# Patient Record
Sex: MALE | Race: WHITE | Employment: FULL TIME | URBAN - METROPOLITAN AREA
[De-identification: names, ages, dates, MRNs, and addresses within clinical notes are randomized per-mention and may not be internally consistent; named-entity substitution may affect disease eponyms.]

---

## 2017-06-08 ENCOUNTER — OFFICE VISIT (OUTPATIENT)
Dept: FAMILY MEDICINE CLINIC | Facility: CLINIC | Age: 47
End: 2017-06-08

## 2017-06-08 ENCOUNTER — TELEPHONE (OUTPATIENT)
Dept: FAMILY MEDICINE CLINIC | Facility: CLINIC | Age: 47
End: 2017-06-08

## 2017-06-08 VITALS
DIASTOLIC BLOOD PRESSURE: 83 MMHG | HEIGHT: 70.16 IN | WEIGHT: 186.63 LBS | RESPIRATION RATE: 17 BRPM | SYSTOLIC BLOOD PRESSURE: 122 MMHG | HEART RATE: 66 BPM | BODY MASS INDEX: 26.72 KG/M2 | TEMPERATURE: 98 F

## 2017-06-08 DIAGNOSIS — S20.212D CHEST WALL CONTUSION, LEFT, SUBSEQUENT ENCOUNTER: ICD-10-CM

## 2017-06-08 DIAGNOSIS — S69.91XD RIGHT WRIST INJURY, SUBSEQUENT ENCOUNTER: ICD-10-CM
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
## 2017-06-08 PROCEDURE — 99212 OFFICE O/P EST SF 10 MIN: CPT | Performed by: FAMILY MEDICINE

## 2017-06-08 PROCEDURE — 99213 OFFICE O/P EST LOW 20 MIN: CPT | Performed by: FAMILY MEDICINE

## 2017-06-08 RX ORDER — TRAMADOL HYDROCHLORIDE 50 MG/1
TABLET ORAL
Refills: 0 | COMMUNITY
Start: 2017-06-02 | End: 2017-12-21

## 2017-06-08 RX ORDER — NAPROXEN SODIUM 550 MG/1
TABLET ORAL
Refills: 0 | COMMUNITY
Start: 2017-06-02 | End: 2017-12-21

## 2017-06-08 NOTE — TELEPHONE ENCOUNTER
Left a message. Per wife she will have him call us back. When the patient calls back please schedule accordingly as below. Thank You.
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
## 2017-06-08 NOTE — TELEPHONE ENCOUNTER
Can you give an acute visit with me tomorrow?   If patient's schedule does not accommodate okay to see SK
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
## 2017-12-08 RX ORDER — LISINOPRIL AND HYDROCHLOROTHIAZIDE 20; 12.5 MG/1; MG/1
TABLET ORAL
Qty: 60 TABLET | Refills: 1 | Status: SHIPPED | OUTPATIENT
Start: 2017-12-08 | End: 2018-02-07

## 2017-12-21 ENCOUNTER — OFFICE VISIT (OUTPATIENT)
Dept: FAMILY MEDICINE CLINIC | Facility: CLINIC | Age: 47
End: 2017-12-21

## 2017-12-21 VITALS
WEIGHT: 186 LBS | HEART RATE: 60 BPM | RESPIRATION RATE: 16 BRPM | DIASTOLIC BLOOD PRESSURE: 88 MMHG | SYSTOLIC BLOOD PRESSURE: 142 MMHG | TEMPERATURE: 98 F | BODY MASS INDEX: 27 KG/M2

## 2017-12-21 DIAGNOSIS — R05.9 COUGH: Primary | ICD-10-CM

## 2017-12-21 PROCEDURE — 99213 OFFICE O/P EST LOW 20 MIN: CPT | Performed by: FAMILY MEDICINE

## 2017-12-21 PROCEDURE — 99212 OFFICE O/P EST SF 10 MIN: CPT | Performed by: FAMILY MEDICINE

## 2017-12-21 RX ORDER — AZITHROMYCIN 250 MG/1
TABLET, FILM COATED ORAL
Qty: 6 TABLET | Refills: 0 | Status: SHIPPED | OUTPATIENT
Start: 2017-12-21 | End: 2018-03-05 | Stop reason: ALTCHOICE

## 2017-12-21 NOTE — PROGRESS NOTES
HPI: Jose Peck is a 49year old male who presents for cold symptoms for the past 10 days. Has stuffy nose, cough, and chest congestion. No fever. Has nasal drainage. No sinus pressure or pain. No v/d. Family members have been sick.  Using OTC products which

## 2018-02-07 RX ORDER — LISINOPRIL AND HYDROCHLOROTHIAZIDE 20; 12.5 MG/1; MG/1
TABLET ORAL
Qty: 60 TABLET | Refills: 0 | Status: SHIPPED | OUTPATIENT
Start: 2018-02-07 | End: 2018-03-17

## 2018-02-07 NOTE — TELEPHONE ENCOUNTER
Please call patient to schedule appointment.  Must have appointment before further refills ROUTINE PHYSICAL with fasting labs

## 2018-02-19 NOTE — TELEPHONE ENCOUNTER
Advised patient of Dr. Nestora Lanes note below. Patient verbalized understanding. Physical scheduled on 03/05/18 at 2:15 p.m.

## 2018-03-05 ENCOUNTER — APPOINTMENT (OUTPATIENT)
Dept: LAB | Age: 48
End: 2018-03-05
Attending: FAMILY MEDICINE
Payer: COMMERCIAL

## 2018-03-05 ENCOUNTER — OFFICE VISIT (OUTPATIENT)
Dept: FAMILY MEDICINE CLINIC | Facility: CLINIC | Age: 48
End: 2018-03-05

## 2018-03-05 VITALS
HEIGHT: 70.16 IN | RESPIRATION RATE: 17 BRPM | SYSTOLIC BLOOD PRESSURE: 123 MMHG | HEART RATE: 62 BPM | BODY MASS INDEX: 26.63 KG/M2 | WEIGHT: 186 LBS | DIASTOLIC BLOOD PRESSURE: 83 MMHG | TEMPERATURE: 98 F

## 2018-03-05 DIAGNOSIS — Z00.00 ROUTINE PHYSICAL EXAMINATION: Primary | ICD-10-CM

## 2018-03-05 DIAGNOSIS — I10 BENIGN ESSENTIAL HTN: ICD-10-CM

## 2018-03-05 DIAGNOSIS — Z00.00 ROUTINE PHYSICAL EXAMINATION: ICD-10-CM

## 2018-03-05 LAB
ANION GAP SERPL CALC-SCNC: 9 MMOL/L (ref 0–18)
BUN SERPL-MCNC: 15 MG/DL (ref 8–20)
BUN/CREAT SERPL: 15 (ref 10–20)
CALCIUM SERPL-MCNC: 9.3 MG/DL (ref 8.5–10.5)
CHLORIDE SERPL-SCNC: 100 MMOL/L (ref 95–110)
CHOLEST SERPL-MCNC: 197 MG/DL (ref 110–200)
CO2 SERPL-SCNC: 30 MMOL/L (ref 22–32)
CREAT SERPL-MCNC: 1 MG/DL (ref 0.5–1.5)
GLUCOSE SERPL-MCNC: 90 MG/DL (ref 70–99)
HDLC SERPL-MCNC: 58 MG/DL
LDLC SERPL CALC-MCNC: 125 MG/DL (ref 0–99)
NONHDLC SERPL-MCNC: 139 MG/DL
OSMOLALITY UR CALC.SUM OF ELEC: 288 MOSM/KG (ref 275–295)
POTASSIUM SERPL-SCNC: 3.6 MMOL/L (ref 3.3–5.1)
SODIUM SERPL-SCNC: 139 MMOL/L (ref 136–144)
TRIGL SERPL-MCNC: 69 MG/DL (ref 1–149)

## 2018-03-05 PROCEDURE — 90715 TDAP VACCINE 7 YRS/> IM: CPT | Performed by: FAMILY MEDICINE

## 2018-03-05 PROCEDURE — 90674 CCIIV4 VAC NO PRSV 0.5 ML IM: CPT | Performed by: FAMILY MEDICINE

## 2018-03-05 PROCEDURE — 80048 BASIC METABOLIC PNL TOTAL CA: CPT

## 2018-03-05 PROCEDURE — 90471 IMMUNIZATION ADMIN: CPT | Performed by: FAMILY MEDICINE

## 2018-03-05 PROCEDURE — 99396 PREV VISIT EST AGE 40-64: CPT | Performed by: FAMILY MEDICINE

## 2018-03-05 PROCEDURE — 36415 COLL VENOUS BLD VENIPUNCTURE: CPT

## 2018-03-05 PROCEDURE — 90472 IMMUNIZATION ADMIN EACH ADD: CPT | Performed by: FAMILY MEDICINE

## 2018-03-05 PROCEDURE — 80061 LIPID PANEL: CPT

## 2018-03-05 NOTE — PROGRESS NOTES
HPI:    Patient ID: Terry Washington is a 49year old male. HPI    Review of Systems   Constitutional: Negative. Respiratory: Negative. Cardiovascular: Negative. Gastrointestinal: Negative. Skin: Negative. Neurological: Negative.

## 2018-03-19 RX ORDER — LISINOPRIL AND HYDROCHLOROTHIAZIDE 20; 12.5 MG/1; MG/1
TABLET ORAL
Qty: 60 TABLET | Refills: 11 | Status: SHIPPED | OUTPATIENT
Start: 2018-03-19 | End: 2019-03-10

## 2019-01-10 ENCOUNTER — MED REC SCAN ONLY (OUTPATIENT)
Dept: FAMILY MEDICINE CLINIC | Facility: CLINIC | Age: 49
End: 2019-01-10

## 2019-03-12 RX ORDER — LISINOPRIL AND HYDROCHLOROTHIAZIDE 20; 12.5 MG/1; MG/1
TABLET ORAL
Qty: 60 TABLET | Refills: 1 | Status: SHIPPED | OUTPATIENT
Start: 2019-03-12 | End: 2019-05-10

## 2019-03-12 NOTE — TELEPHONE ENCOUNTER
Please call patient and schedule appt, one refill sent but MUST make appt at this time. Routine physical with fasting labs, please bring home blood pressures if available.

## 2019-03-23 NOTE — TELEPHONE ENCOUNTER
Spoke with May Willis, notified him that he needs to schedule a PE with fasting labs.  Pt verbalized understanding and will schedule when he can

## 2019-04-09 ENCOUNTER — OFFICE VISIT (OUTPATIENT)
Dept: FAMILY MEDICINE CLINIC | Facility: CLINIC | Age: 49
End: 2019-04-09
Payer: COMMERCIAL

## 2019-04-09 ENCOUNTER — APPOINTMENT (OUTPATIENT)
Dept: LAB | Age: 49
End: 2019-04-09
Attending: FAMILY MEDICINE
Payer: COMMERCIAL

## 2019-04-09 VITALS
RESPIRATION RATE: 18 BRPM | HEART RATE: 61 BPM | BODY MASS INDEX: 26.77 KG/M2 | WEIGHT: 187 LBS | DIASTOLIC BLOOD PRESSURE: 78 MMHG | SYSTOLIC BLOOD PRESSURE: 117 MMHG | TEMPERATURE: 97 F | HEIGHT: 70.25 IN

## 2019-04-09 DIAGNOSIS — Z00.00 ROUTINE PHYSICAL EXAMINATION: Primary | ICD-10-CM

## 2019-04-09 DIAGNOSIS — Z00.00 ROUTINE PHYSICAL EXAMINATION: ICD-10-CM

## 2019-04-09 DIAGNOSIS — Z80.0 FAMILY HISTORY OF COLON CANCER: ICD-10-CM

## 2019-04-09 DIAGNOSIS — Z12.11 COLON CANCER SCREENING: ICD-10-CM

## 2019-04-09 PROBLEM — H53.009 LAZY EYE: Status: RESOLVED | Noted: 2019-04-09 | Resolved: 2019-04-09

## 2019-04-09 PROCEDURE — 80048 BASIC METABOLIC PNL TOTAL CA: CPT

## 2019-04-09 PROCEDURE — 99396 PREV VISIT EST AGE 40-64: CPT | Performed by: FAMILY MEDICINE

## 2019-04-09 PROCEDURE — 36415 COLL VENOUS BLD VENIPUNCTURE: CPT

## 2019-04-09 PROCEDURE — 80061 LIPID PANEL: CPT

## 2019-04-09 NOTE — PROGRESS NOTES
HPI:    Patient ID: Claudia Boo is a 51year old male. HPI    Review of Systems   Constitutional: Negative. Respiratory: Negative. Cardiovascular: Negative. Gastrointestinal: Negative. Skin: Negative. Neurological: Negative.

## 2019-05-10 RX ORDER — LISINOPRIL AND HYDROCHLOROTHIAZIDE 20; 12.5 MG/1; MG/1
TABLET ORAL
Qty: 60 TABLET | Refills: 3 | Status: SHIPPED | OUTPATIENT
Start: 2019-05-10 | End: 2019-09-17

## 2019-09-17 RX ORDER — LISINOPRIL AND HYDROCHLOROTHIAZIDE 20; 12.5 MG/1; MG/1
TABLET ORAL
Qty: 90 TABLET | Refills: 1 | Status: SHIPPED | OUTPATIENT
Start: 2019-09-17 | End: 2020-01-08

## 2019-09-17 NOTE — TELEPHONE ENCOUNTER
Refill passed per [facility] protocol.   Hypertensive Medications  Protocol Criteria:  · Appointment scheduled in the past 6 months or in the next 3 months  · BMP or CMP in the past 12 months  · Creatinine result < 2  Recent Outpatient Visits

## 2020-01-09 RX ORDER — LISINOPRIL AND HYDROCHLOROTHIAZIDE 20; 12.5 MG/1; MG/1
TABLET ORAL
Qty: 90 TABLET | Refills: 0 | Status: SHIPPED | OUTPATIENT
Start: 2020-01-09 | End: 2020-02-20

## 2020-01-31 ENCOUNTER — NURSE ONLY (OUTPATIENT)
Dept: GASTROENTEROLOGY | Facility: CLINIC | Age: 50
End: 2020-01-31

## 2020-01-31 DIAGNOSIS — Z12.11 COLON CANCER SCREENING: Primary | ICD-10-CM

## 2020-01-31 NOTE — PROGRESS NOTES
Pt contacted and reviewed allergies, pharmacy, medications, and GI sxs (none). Pt has never had a colonoscopy in the past and wants to schedule d/t age.     Anticoagulants:N/A  Diabetic Meds: N/A  BP meds(Ace inhibitors/ARB's):Lisinopril/HCTZ- 2 tabs daily

## 2020-02-03 RX ORDER — POLYETHYLENE GLYCOL 3350, SODIUM CHLORIDE, SODIUM BICARBONATE, POTASSIUM CHLORIDE 420; 11.2; 5.72; 1.48 G/4L; G/4L; G/4L; G/4L
POWDER, FOR SOLUTION ORAL
Qty: 1 BOTTLE | Refills: 0 | Status: ON HOLD | OUTPATIENT
Start: 2020-02-03 | End: 2020-06-02

## 2020-02-03 NOTE — PROGRESS NOTES
Scheduled for:  Colonoscopy 67275  Provider Name: Dr. Drew Morse  Date:  3/31/20  Location:  [facility]  Sedation:  MAC  Time:   0900 (pt is aware to arrive at 0800)   Prep:  Phoenix Jeffers, sent via [employer]/Allergies Reconciled?:  Physician reviewed   Diagnosis with c

## 2020-02-03 NOTE — PROGRESS NOTES
47year old male patient of Dr. Kati Wall with PMHx BMI 26, HTN, family history of prostate, not colon cancer per report, who denies current GI sxs. No prior colonoscopy. No recent CBC on file.      Schedule screening CLN with IV or MAC with Dr. Lynette Gupta

## 2020-02-20 NOTE — TELEPHONE ENCOUNTER
Lucila Oliver needs a refill of:                    • LISINOPRIL-HYDROCHLOROTHIAZIDE 20-12.5 MG Oral Tab TAKE 2 TABLETS BY MOUTH DAILY 90 tablet 0

## 2020-02-21 RX ORDER — LISINOPRIL AND HYDROCHLOROTHIAZIDE 20; 12.5 MG/1; MG/1
2 TABLET ORAL
Qty: 180 TABLET | Refills: 1 | Status: SHIPPED | OUTPATIENT
Start: 2020-02-21 | End: 2020-08-25

## 2020-02-21 NOTE — TELEPHONE ENCOUNTER
Please review; protocol failed. Requested Prescriptions     Pending Prescriptions Disp Refills   • Lisinopril-hydroCHLOROthiazide 20-12.5 MG Oral Tab 90 tablet 0     Sig: Take 2 tablets by mouth once daily.          Recent Visits  Date Type Provider Dept

## 2020-03-03 ENCOUNTER — OFFICE VISIT (OUTPATIENT)
Dept: FAMILY MEDICINE CLINIC | Facility: CLINIC | Age: 50
End: 2020-03-03
Payer: COMMERCIAL

## 2020-03-03 VITALS
WEIGHT: 194 LBS | SYSTOLIC BLOOD PRESSURE: 128 MMHG | HEIGHT: 71 IN | BODY MASS INDEX: 27.16 KG/M2 | DIASTOLIC BLOOD PRESSURE: 88 MMHG | HEART RATE: 60 BPM

## 2020-03-03 DIAGNOSIS — I10 ESSENTIAL HYPERTENSION: Primary | ICD-10-CM

## 2020-03-03 PROCEDURE — 99212 OFFICE O/P EST SF 10 MIN: CPT | Performed by: FAMILY MEDICINE

## 2020-03-03 NOTE — PROGRESS NOTES
HPI:    Patient ID: Jaki Torres is a 47year old male. Hypertension   This is a chronic problem. The current episode started more than 1 year ago. The problem is unchanged. The problem is controlled.  Pertinent negatives include no anxiety, blurred v

## 2020-03-18 ENCOUNTER — TELEPHONE (OUTPATIENT)
Dept: GASTROENTEROLOGY | Facility: CLINIC | Age: 50
End: 2020-03-18

## 2020-03-18 DIAGNOSIS — Z12.11 COLON CANCER SCREENING: Primary | ICD-10-CM

## 2020-03-18 NOTE — TELEPHONE ENCOUNTER
Rescheduled for:  Colonoscopy 00279  Provider Name: Dr. Isabella Felty  Date:  FROM - 3/31/20                  TO - 6/2/20  Location:  [facility]  Sedation:  MAC  Time:   FROM - 9:00 am              TO - 11:15 am (pt is aware to arrive at 10:15 am)   Prep:  Aurelia Velásquez sent

## 2020-06-02 ENCOUNTER — ANESTHESIA (OUTPATIENT)
Dept: ENDOSCOPY | Facility: HOSPITAL | Age: 50
End: 2020-06-02
Payer: COMMERCIAL

## 2020-06-02 ENCOUNTER — TELEPHONE (OUTPATIENT)
Dept: GASTROENTEROLOGY | Facility: CLINIC | Age: 50
End: 2020-06-02

## 2020-06-02 ENCOUNTER — ANESTHESIA EVENT (OUTPATIENT)
Dept: ENDOSCOPY | Facility: HOSPITAL | Age: 50
End: 2020-06-02
Payer: COMMERCIAL

## 2020-06-02 ENCOUNTER — NURSE TRIAGE (OUTPATIENT)
Dept: FAMILY MEDICINE CLINIC | Facility: CLINIC | Age: 50
End: 2020-06-02

## 2020-06-02 ENCOUNTER — HOSPITAL ENCOUNTER (EMERGENCY)
Facility: HOSPITAL | Age: 50
Discharge: HOME | End: 2020-06-02
Attending: PHYSICIAN ASSISTANT
Payer: COMMERCIAL

## 2020-06-02 ENCOUNTER — HOSPITAL ENCOUNTER (OUTPATIENT)
Facility: HOSPITAL | Age: 50
Setting detail: HOSPITAL OUTPATIENT SURGERY
Discharge: HOME | End: 2020-06-02
Attending: INTERNAL MEDICINE | Admitting: INTERNAL MEDICINE
Payer: COMMERCIAL

## 2020-06-02 VITALS
OXYGEN SATURATION: 98 % | DIASTOLIC BLOOD PRESSURE: 98 MMHG | BODY MASS INDEX: 26.6 KG/M2 | WEIGHT: 190 LBS | RESPIRATION RATE: 16 BRPM | SYSTOLIC BLOOD PRESSURE: 135 MMHG | TEMPERATURE: 99 F | HEART RATE: 69 BPM | HEIGHT: 71 IN

## 2020-06-02 VITALS
HEART RATE: 60 BPM | DIASTOLIC BLOOD PRESSURE: 89 MMHG | SYSTOLIC BLOOD PRESSURE: 131 MMHG | WEIGHT: 180 LBS | OXYGEN SATURATION: 100 % | RESPIRATION RATE: 12 BRPM | BODY MASS INDEX: 25.2 KG/M2 | TEMPERATURE: 98 F | HEIGHT: 71 IN

## 2020-06-02 DIAGNOSIS — Z12.11 COLON CANCER SCREENING: Primary | ICD-10-CM

## 2020-06-02 DIAGNOSIS — K64.9 HEMORRHOIDS: ICD-10-CM

## 2020-06-02 DIAGNOSIS — Z01.818 PRE-OP TESTING: ICD-10-CM

## 2020-06-02 DIAGNOSIS — S05.02XA ABRASION OF LEFT CORNEA, INITIAL ENCOUNTER: Primary | ICD-10-CM

## 2020-06-02 DIAGNOSIS — R03.0 ELEVATED BLOOD PRESSURE READING: ICD-10-CM

## 2020-06-02 PROCEDURE — 0DJD8ZZ INSPECTION OF LOWER INTESTINAL TRACT, VIA NATURAL OR ARTIFICIAL OPENING ENDOSCOPIC: ICD-10-PCS | Performed by: INTERNAL MEDICINE

## 2020-06-02 PROCEDURE — 45378 DIAGNOSTIC COLONOSCOPY: CPT | Performed by: INTERNAL MEDICINE

## 2020-06-02 PROCEDURE — 99283 EMERGENCY DEPT VISIT LOW MDM: CPT

## 2020-06-02 RX ORDER — SODIUM CHLORIDE, SODIUM LACTATE, POTASSIUM CHLORIDE, CALCIUM CHLORIDE 600; 310; 30; 20 MG/100ML; MG/100ML; MG/100ML; MG/100ML
INJECTION, SOLUTION INTRAVENOUS CONTINUOUS
Status: CANCELLED | OUTPATIENT
Start: 2020-06-02

## 2020-06-02 RX ORDER — SODIUM CHLORIDE, SODIUM LACTATE, POTASSIUM CHLORIDE, CALCIUM CHLORIDE 600; 310; 30; 20 MG/100ML; MG/100ML; MG/100ML; MG/100ML
INJECTION, SOLUTION INTRAVENOUS CONTINUOUS
Status: DISCONTINUED | OUTPATIENT
Start: 2020-06-02 | End: 2020-06-02

## 2020-06-02 RX ORDER — TRAMADOL HYDROCHLORIDE 50 MG/1
50 TABLET ORAL EVERY 6 HOURS PRN
Qty: 12 TABLET | Refills: 0 | Status: SHIPPED | OUTPATIENT
Start: 2020-06-02 | End: 2020-10-29 | Stop reason: ALTCHOICE

## 2020-06-02 RX ORDER — TETRACAINE HYDROCHLORIDE 5 MG/ML
1 SOLUTION OPHTHALMIC ONCE
Status: COMPLETED | OUTPATIENT
Start: 2020-06-02 | End: 2020-06-02

## 2020-06-02 RX ORDER — ERYTHROMYCIN 5 MG/G
1 OINTMENT OPHTHALMIC
Qty: 1 G | Refills: 0 | Status: SHIPPED | OUTPATIENT
Start: 2020-06-02 | End: 2020-06-09

## 2020-06-02 RX ORDER — ONDANSETRON 2 MG/ML
4 INJECTION INTRAMUSCULAR; INTRAVENOUS ONCE AS NEEDED
Status: CANCELLED | OUTPATIENT
Start: 2020-06-02 | End: 2020-06-02

## 2020-06-02 RX ORDER — NALOXONE HYDROCHLORIDE 0.4 MG/ML
80 INJECTION, SOLUTION INTRAMUSCULAR; INTRAVENOUS; SUBCUTANEOUS AS NEEDED
Status: CANCELLED | OUTPATIENT
Start: 2020-06-02 | End: 2020-06-02

## 2020-06-02 RX ORDER — LIDOCAINE HYDROCHLORIDE 10 MG/ML
INJECTION, SOLUTION EPIDURAL; INFILTRATION; INTRACAUDAL; PERINEURAL AS NEEDED
Status: DISCONTINUED | OUTPATIENT
Start: 2020-06-02 | End: 2020-06-02 | Stop reason: SURG

## 2020-06-02 RX ADMIN — LIDOCAINE HYDROCHLORIDE 50 MG: 10 INJECTION, SOLUTION EPIDURAL; INFILTRATION; INTRACAUDAL; PERINEURAL at 11:23:00

## 2020-06-02 RX ADMIN — SODIUM CHLORIDE, SODIUM LACTATE, POTASSIUM CHLORIDE, CALCIUM CHLORIDE: 600; 310; 30; 20 INJECTION, SOLUTION INTRAVENOUS at 11:57:00

## 2020-06-02 NOTE — ED PROVIDER NOTES
Patient Seen in: [facility] AND [facility] Emergency Department    History   Patient presents with: Eye Visual Problem    Stated Complaint:     HPI    69-year-old male presents with chief complaint of left eye pain. Onset 1230 today.   Patient states that he unde Never Used    Alcohol use:  Yes      Alcohol/week: 2.5 standard drinks      Types: 3 Cans of beer per week      Comment: (beer & wine), 1 beer weekly    Drug use: No      Review of Systems    Positive for stated complaint:   Other systems are as noted in HP soft, nontender, nondistended. There is no rebound tenderness or guarding. No organomegaly is noted. No peritoneal signs. Genitourinary: Not examined. Lymphatic: No gross lymphadenopathy noted.   Musculoskeletal: Musculoskeletal system is grossly intact pressure. Medications Prescribed:  Current Discharge Medication List    START taking these medications    erythromycin 5 MG/GM Ophthalmic Ointment  Apply 1 Application to eye every 4 (four) hours while awake for 7 days.   Qty: 1 g Refills: 0    traMADol

## 2020-06-02 NOTE — TELEPHONE ENCOUNTER
Colonoscopy recall entered for 6/2/2030 in [facility] and placed in health maintenance      Inpatient Notes   Received:  Today   Message Contents   Jonnie Root MD  P Em Gi Clinical Staff             GI staff: please place recall in for colonoscopy in 10 year Procedure:  Informed consent was obtained from the patient after the risks of the procedure were discussed, including but not limited to bleeding, perforation, aspiration, infection, or possibility of a missed lesion.  After discussions of the risks/benefit

## 2020-06-02 NOTE — ANESTHESIA POSTPROCEDURE EVALUATION
Patient: Jose Camejo    Procedure Summary     Date:  06/02/20 Room / Location:  [facility] ENDOSCOPY 01 / [facility] ENDOSCOPY    Anesthesia Start:  6968 Anesthesia Stop:  8541    Procedure:  COLONOSCOPY (N/A ) Diagnosis:       Colon cancer screening      (hemorrhoi

## 2020-06-02 NOTE — ANESTHESIA PREPROCEDURE EVALUATION
Anesthesia PreOp Note    HPI:     Gaby Arboleda is a 47year old male who presents for preoperative consultation requested by: Blanca Martinez MD    Date of Surgery: 6/2/2020    Procedure(s):  COLONOSCOPY  Indication: Colon cancer screening    Relevant Occupational History      Not on file    Social Needs      Financial resource strain: Not on file      Food insecurity:        Worry: Not on file        Inability: Not on file      Transportation needs:        Medical: Not on file        Non-medical: Not o °C). His blood pressure is 125/81 and his pulse is 69. His respiration is 16 and oxygen saturation is 99%.     05/29/20  1448 06/02/20  1032 06/02/20  1046   BP:  125/81    Pulse:  69    Resp:  16    Temp:   97.7 °F (36.5 °C)   TempSrc:  Oral Oral   SpO2:

## 2020-06-02 NOTE — TELEPHONE ENCOUNTER
Action Requested: Summary for Provider     []  Critical Lab, Recommendations Needed  [] Need Additional Advice  []   FYI    []   Need Orders  [] Need Medications Sent to Pharmacy  []  Other     SUMMARY: Advised Immediate care ([place])    Reason for call:

## 2020-06-02 NOTE — ED NOTES
Pt states he had colonoscopy done earlier today and when he woke up from anesthesia his left eye was bothering him. Pt states he went home and eye was still bothering him so he decided to get it checked out.  Pt left eye noted to be irritated with clear tea

## 2020-06-02 NOTE — H&P
History & Physical Examination    Patient Name: Oren Raman  MRN: [id number]  [facility]: [id number]  YOB: 1970    Diagnosis: screening for colon cancer    Patient currently denies any GI symptoms of nausea, vomiting, dyspepsia, dysphagia, hema They understand and agree to proceed with plan of care. I have reviewed the History and Physical done within the last 30 days. Any changes noted above.     Renate Romero, [address] [place] [address] - Gastroenterology  6/2/2020  11:24 AM

## 2020-06-02 NOTE — ED INITIAL ASSESSMENT (HPI)
Pt states he had a colonoscopy earlier today and when he woke up from anesthesia he felt \" something in his left eye\" he did tell staff who mentioned that he was rubbing his eye during the procedure. Pt has pain and tearing to left eye.  Pt denies trauma

## 2020-06-02 NOTE — OPERATIVE REPORT
COLONOSCOPY REPORT    Samy Cuevas     1970 Age 47year old   PCP Alesha Buck MD Endoscopist Wade Trimble MD     Date of procedure: 20    Procedure: Colonoscopy     Pre-operative diagnosis: Screening    Post-operative diagnos no masses palpated. Impression:   · Normal colonoscopy to the terminal ileum, other than small internal hemorrhoids. Recommend:  · Repeat CLN in 10 years. If new signs or symptoms develop, colonoscopy may need to be repeated sooner.    · High fiber

## 2020-06-03 NOTE — ED NOTES
Pt left ed ambulatory with steady gait with wife. Pt speaking full clear sentences without difficulty. Pt verbalized understanding and importance of follow up and d/c instructions.  Pt told to continue to take his HTN meds as soon as possible because he did

## 2020-06-04 ENCOUNTER — TELEPHONE (OUTPATIENT)
Dept: GASTROENTEROLOGY | Facility: CLINIC | Age: 50
End: 2020-06-04

## 2020-06-04 NOTE — DISCHARGE SUMMARY
Wife informed RN that  needed to go to ER several hours after colonoscopy and was diagnosed with scratched cornea. Spoke with recovery nurse and she stated that GI MD was informed and saw patient. Unit manager to follow up with GI doctor.

## 2020-06-04 NOTE — TELEPHONE ENCOUNTER
Patient had a c-scope with me 2 days ago. I spoke with patient and wife today. He did have some red eyes on L side after c-scope but he said he usually gets red eyes after getting dehydrated and this was not new for him.  I offered IV fluids after c-scope t

## 2020-08-25 ENCOUNTER — TELEPHONE (OUTPATIENT)
Dept: FAMILY MEDICINE CLINIC | Facility: CLINIC | Age: 50
End: 2020-08-25

## 2020-08-25 RX ORDER — LISINOPRIL AND HYDROCHLOROTHIAZIDE 20; 12.5 MG/1; MG/1
2 TABLET ORAL
Qty: 180 TABLET | Refills: 1 | Status: SHIPPED | OUTPATIENT
Start: 2020-08-25 | End: 2021-01-25

## 2020-08-25 NOTE — TELEPHONE ENCOUNTER
Emory Vail needs a refill of:    Lisinopril-hydroCHLOROthiazide 20-12.5 MG Oral Tab Take 2 tablets by mouth once daily.  180 tablet 1

## 2020-08-25 NOTE — TELEPHONE ENCOUNTER
Spoke with the patient informed him of the doctors message below.  Pt states that he will call back to schedule his physical.

## 2020-08-25 NOTE — TELEPHONE ENCOUNTER
Please let patient know refill sent to pharmacy but must schedule for routine physical with fasting labs.

## 2020-10-29 ENCOUNTER — LAB ENCOUNTER (OUTPATIENT)
Dept: LAB | Age: 50
End: 2020-10-29
Attending: FAMILY MEDICINE
Payer: COMMERCIAL

## 2020-10-29 ENCOUNTER — OFFICE VISIT (OUTPATIENT)
Dept: FAMILY MEDICINE CLINIC | Facility: CLINIC | Age: 50
End: 2020-10-29
Payer: COMMERCIAL

## 2020-10-29 VITALS
SYSTOLIC BLOOD PRESSURE: 114 MMHG | HEIGHT: 69.5 IN | HEART RATE: 72 BPM | TEMPERATURE: 97 F | WEIGHT: 197 LBS | DIASTOLIC BLOOD PRESSURE: 78 MMHG | BODY MASS INDEX: 28.52 KG/M2 | RESPIRATION RATE: 16 BRPM

## 2020-10-29 DIAGNOSIS — H91.90 HEARING LOSS, UNSPECIFIED HEARING LOSS TYPE, UNSPECIFIED LATERALITY: ICD-10-CM

## 2020-10-29 DIAGNOSIS — Z00.00 ROUTINE PHYSICAL EXAMINATION: Primary | ICD-10-CM

## 2020-10-29 DIAGNOSIS — E78.5 HYPERLIPIDEMIA, UNSPECIFIED HYPERLIPIDEMIA TYPE: ICD-10-CM

## 2020-10-29 DIAGNOSIS — Z00.00 ROUTINE PHYSICAL EXAMINATION: ICD-10-CM

## 2020-10-29 PROBLEM — Z98.890 HISTORY OF COLONOSCOPY: Status: ACTIVE | Noted: 2020-10-29

## 2020-10-29 PROBLEM — Z80.42 FAMILY HISTORY OF PROSTATE CANCER IN FATHER: Status: ACTIVE | Noted: 2020-10-29

## 2020-10-29 PROCEDURE — 3074F SYST BP LT 130 MM HG: CPT | Performed by: FAMILY MEDICINE

## 2020-10-29 PROCEDURE — 99396 PREV VISIT EST AGE 40-64: CPT | Performed by: FAMILY MEDICINE

## 2020-10-29 PROCEDURE — 36415 COLL VENOUS BLD VENIPUNCTURE: CPT

## 2020-10-29 PROCEDURE — 85027 COMPLETE CBC AUTOMATED: CPT

## 2020-10-29 PROCEDURE — 90750 HZV VACC RECOMBINANT IM: CPT | Performed by: FAMILY MEDICINE

## 2020-10-29 PROCEDURE — 3078F DIAST BP <80 MM HG: CPT | Performed by: FAMILY MEDICINE

## 2020-10-29 PROCEDURE — 80048 BASIC METABOLIC PNL TOTAL CA: CPT

## 2020-10-29 PROCEDURE — 90471 IMMUNIZATION ADMIN: CPT | Performed by: FAMILY MEDICINE

## 2020-10-29 PROCEDURE — 80061 LIPID PANEL: CPT

## 2020-10-29 PROCEDURE — 3008F BODY MASS INDEX DOCD: CPT | Performed by: FAMILY MEDICINE

## 2020-10-29 NOTE — PROGRESS NOTES
HPI:    Patient ID: Terry Washington is a 47year old male. HPI    Review of Systems   Constitutional: Negative. HENT: Positive for hearing loss. Respiratory: Negative. Cardiovascular: Negative. Gastrointestinal: Negative. Skin: Negative. [Shingrix -Shingles] (32318)      Meds This Visit:  Requested Prescriptions      No prescriptions requested or ordered in this encounter       Imaging & Referrals:  [facility]  [facility]#[id number]

## 2021-01-25 RX ORDER — LISINOPRIL AND HYDROCHLOROTHIAZIDE 20; 12.5 MG/1; MG/1
2 TABLET ORAL
Qty: 180 TABLET | Refills: 1 | Status: SHIPPED | OUTPATIENT
Start: 2021-01-25 | End: 2021-07-30

## 2021-01-25 NOTE — TELEPHONE ENCOUNTER
Margret Anderson needs a refill of:    Current Outpatient Medications   Medication Sig Dispense Refill   • Lisinopril-hydroCHLOROthiazide 20-12.5 MG Oral Tab Take 2 tablets by mouth once daily.  180 tablet 1

## 2021-03-01 ENCOUNTER — OFFICE VISIT (OUTPATIENT)
Dept: ALLERGY | Facility: CLINIC | Age: 51
End: 2021-03-01
Payer: COMMERCIAL

## 2021-03-01 ENCOUNTER — NURSE ONLY (OUTPATIENT)
Dept: ALLERGY | Facility: CLINIC | Age: 51
End: 2021-03-01
Payer: COMMERCIAL

## 2021-03-01 VITALS
OXYGEN SATURATION: 99 % | WEIGHT: 197 LBS | DIASTOLIC BLOOD PRESSURE: 80 MMHG | HEART RATE: 67 BPM | SYSTOLIC BLOOD PRESSURE: 119 MMHG | BODY MASS INDEX: 28.52 KG/M2 | HEIGHT: 69.5 IN

## 2021-03-01 DIAGNOSIS — J30.89 PERENNIAL ALLERGIC RHINITIS: Primary | ICD-10-CM

## 2021-03-01 DIAGNOSIS — R09.81 NASAL CONGESTION: ICD-10-CM

## 2021-03-01 DIAGNOSIS — J30.89 PERENNIAL ALLERGIC RHINITIS: ICD-10-CM

## 2021-03-01 PROCEDURE — 3074F SYST BP LT 130 MM HG: CPT | Performed by: ALLERGY & IMMUNOLOGY

## 2021-03-01 PROCEDURE — 3008F BODY MASS INDEX DOCD: CPT | Performed by: ALLERGY & IMMUNOLOGY

## 2021-03-01 PROCEDURE — 99204 OFFICE O/P NEW MOD 45 MIN: CPT | Performed by: ALLERGY & IMMUNOLOGY

## 2021-03-01 PROCEDURE — 3079F DIAST BP 80-89 MM HG: CPT | Performed by: ALLERGY & IMMUNOLOGY

## 2021-03-01 PROCEDURE — 95024 IQ TESTS W/ALLERGENIC XTRCS: CPT | Performed by: ALLERGY & IMMUNOLOGY

## 2021-03-01 PROCEDURE — 95004 PERQ TESTS W/ALRGNC XTRCS: CPT | Performed by: ALLERGY & IMMUNOLOGY

## 2021-03-01 RX ORDER — LEVOCETIRIZINE DIHYDROCHLORIDE 5 MG/1
5 TABLET, FILM COATED ORAL NIGHTLY
Qty: 30 TABLET | Refills: 0 | Status: SHIPPED | OUTPATIENT
Start: 2021-03-01

## 2021-03-01 NOTE — PATIENT INSTRUCTIONS
Recs:  Handouts on allergies and avoidance measures provided and reviewed including the potential treatment option of immunotherapy  Trial of Xyzal, levocetirizine 5 mg once a night at bedtime as a nonsedating antihistamine in place of Zyrtec  May add Flon

## 2021-03-01 NOTE — PROGRESS NOTES
Shahrzad Canchola is a 45year old male. HPI:   Patient presents with:   Allergies: patient presents for allergy evaluations, in the past 2 months has had increased nasal congestion, nasal congestion is clear, has had a rash on arms and shoulders, itchy wi Social History    Tobacco Use      Smoking status: Never Smoker      Smokeless tobacco: Never Used    Alcohol use: Yes      Alcohol/week: 2.5 standard drinks      Types: 3 Cans of beer per week      Comment: (beer & wine), 1 beer weekly    Drug use:  No lungs are clear to auscultation bilaterally normal respiratory effort   Cardiovascular: regular rate and rhythm no murmurs, gallups, or rubs  Abdomen: soft non-tender non-distended  Skin/Hair: no unusual rashes present  Extremities: no edema, cyanosis, or to self administration of these medications. Teaching, instruction and sample was provided to the patient by myself. Teaching included  a review of potential adverse side effects as well as potential efficacy.   Patient's questions were answered in regard

## 2021-07-30 RX ORDER — LISINOPRIL AND HYDROCHLOROTHIAZIDE 20; 12.5 MG/1; MG/1
2 TABLET ORAL DAILY
Qty: 180 TABLET | Refills: 0 | Status: SHIPPED | OUTPATIENT
Start: 2021-07-30 | End: 2021-10-26

## 2021-10-26 RX ORDER — LISINOPRIL AND HYDROCHLOROTHIAZIDE 20; 12.5 MG/1; MG/1
2 TABLET ORAL DAILY
Qty: 180 TABLET | Refills: 0 | Status: SHIPPED | OUTPATIENT
Start: 2021-10-26 | End: 2022-02-02

## 2022-02-02 RX ORDER — LISINOPRIL AND HYDROCHLOROTHIAZIDE 20; 12.5 MG/1; MG/1
2 TABLET ORAL DAILY
Qty: 180 TABLET | Refills: 0 | Status: SHIPPED | OUTPATIENT
Start: 2022-02-02 | End: 2022-05-06

## 2022-02-02 NOTE — TELEPHONE ENCOUNTER
Refill passed per [address] [facility] protocol. Requested Prescriptions   Pending Prescriptions Disp Refills    LISINOPRIL-HYDROCHLOROTHIAZIDE 20-12.5 MG Oral Tab [Pharmacy Med Name: LISINOPRIL-HCTZ 20/12.5MG TABLETS] 180 tablet 0     Sig: TAKE 2 TABLETS BY MOUTH DAILY        Hypertensive Medications Protocol Failed - 2/2/2022  8:45 AM        Failed - CMP or BMP in past 12 months        Passed - Appointment in past 6 or next 3 months        Passed - GFR Non- > 50     Lab Results   Component Value Date    [facility] 73 10/29/2020                       Future Appointments         Provider Department Appt Notes    In 2 days Wallace Chan MD [address], [address] [place] about shingles vaccine. I think I missed the second dose.             Recent Outpatient Visits              11 months ago Perennial allergic rhinitis    [facility], [address] [facility] [place], [place]    Nurse Only    11 months ago Perennial allergic rhinitis    Joyce Reyes MD    Office Visit    1 year ago Routine physical examination    Ammy Peterson MD    Office Visit    1 year ago Essential hypertension    Ammy Peterson MD    Office Visit    2 years ago Colon cancer screening    [address] GI PROCEDURE    Nurse Only

## 2022-02-04 ENCOUNTER — OFFICE VISIT (OUTPATIENT)
Dept: FAMILY MEDICINE CLINIC | Facility: CLINIC | Age: 52
End: 2022-02-04
Payer: COMMERCIAL

## 2022-02-04 VITALS
WEIGHT: 203.38 LBS | SYSTOLIC BLOOD PRESSURE: 116 MMHG | HEART RATE: 74 BPM | DIASTOLIC BLOOD PRESSURE: 80 MMHG | HEIGHT: 70.5 IN | TEMPERATURE: 98 F | RESPIRATION RATE: 18 BRPM | BODY MASS INDEX: 28.79 KG/M2

## 2022-02-04 DIAGNOSIS — H91.90 HEARING LOSS, UNSPECIFIED HEARING LOSS TYPE, UNSPECIFIED LATERALITY: ICD-10-CM

## 2022-02-04 DIAGNOSIS — Z00.00 ROUTINE PHYSICAL EXAMINATION: Primary | ICD-10-CM

## 2022-02-04 DIAGNOSIS — I10 ESSENTIAL HYPERTENSION: ICD-10-CM

## 2022-02-04 DIAGNOSIS — S20.211A CONTUSION OF RIGHT CHEST WALL, INITIAL ENCOUNTER: ICD-10-CM

## 2022-02-04 DIAGNOSIS — Z12.5 SCREENING FOR PROSTATE CANCER: ICD-10-CM

## 2022-02-04 PROCEDURE — 90472 IMMUNIZATION ADMIN EACH ADD: CPT | Performed by: FAMILY MEDICINE

## 2022-02-04 PROCEDURE — 3074F SYST BP LT 130 MM HG: CPT | Performed by: FAMILY MEDICINE

## 2022-02-04 PROCEDURE — 90471 IMMUNIZATION ADMIN: CPT | Performed by: FAMILY MEDICINE

## 2022-02-04 PROCEDURE — 90750 HZV VACC RECOMBINANT IM: CPT | Performed by: FAMILY MEDICINE

## 2022-02-04 PROCEDURE — 90686 IIV4 VACC NO PRSV 0.5 ML IM: CPT | Performed by: FAMILY MEDICINE

## 2022-02-04 PROCEDURE — 99396 PREV VISIT EST AGE 40-64: CPT | Performed by: FAMILY MEDICINE

## 2022-02-04 PROCEDURE — 3008F BODY MASS INDEX DOCD: CPT | Performed by: FAMILY MEDICINE

## 2022-02-04 PROCEDURE — 3079F DIAST BP 80-89 MM HG: CPT | Performed by: FAMILY MEDICINE

## 2022-04-26 ENCOUNTER — OFFICE VISIT (OUTPATIENT)
Dept: AUDIOLOGY | Facility: CLINIC | Age: 52
End: 2022-04-26
Payer: COMMERCIAL

## 2022-04-26 DIAGNOSIS — H83.3X9: Primary | ICD-10-CM

## 2022-04-26 PROCEDURE — 92567 TYMPANOMETRY: CPT | Performed by: AUDIOLOGIST

## 2022-04-26 PROCEDURE — 92557 COMPREHENSIVE HEARING TEST: CPT | Performed by: AUDIOLOGIST

## 2022-05-06 RX ORDER — LISINOPRIL AND HYDROCHLOROTHIAZIDE 20; 12.5 MG/1; MG/1
2 TABLET ORAL DAILY
Qty: 180 TABLET | Refills: 0 | Status: SHIPPED | OUTPATIENT
Start: 2022-05-06

## 2022-05-06 NOTE — TELEPHONE ENCOUNTER
Please review. Protocol failed. Requested Prescriptions   Pending Prescriptions Disp Refills    LISINOPRIL-HYDROCHLOROTHIAZIDE 20-12.5 MG Oral Tab [Pharmacy Med Name: LISINOPRIL-HCTZ 20/12.5MG TABLETS] 180 tablet 0     Sig: Take 2 tablets by mouth daily.         Hypertensive Medications Protocol Failed - 5/5/2022  3:06 PM        Failed - CMP or BMP in past 12 months        Passed - Appointment in past 6 or next 3 months        Passed - GFR Non- > 50     Lab Results   Component Value Date    [facility] 73 10/29/2020                       Recent Outpatient Visits              1 week ago Noise effect on inner ear, unspecified laterality    [facility] Ryanne Chandler    Office Visit    3 months ago Routine physical examination    [facility], Vonda Cordero MD    Office Visit    1 year ago Perennial allergic rhinitis    [facility], [address] [place] [address]    Nurse Only    1 year ago Perennial allergic rhinitis    Ricky Gay MD    Office Visit    1 year ago Routine physical examination    [facility], Vonda Cordero MD    Office Visit

## 2022-08-07 RX ORDER — LISINOPRIL AND HYDROCHLOROTHIAZIDE 20; 12.5 MG/1; MG/1
2 TABLET ORAL DAILY
Qty: 180 TABLET | Refills: 0 | Status: SHIPPED | OUTPATIENT
Start: 2022-08-07

## 2022-11-08 RX ORDER — LISINOPRIL AND HYDROCHLOROTHIAZIDE 20; 12.5 MG/1; MG/1
2 TABLET ORAL DAILY
Qty: 180 TABLET | Refills: 0 | Status: SHIPPED | OUTPATIENT
Start: 2022-11-08

## 2023-01-12 ENCOUNTER — OFFICE VISIT (OUTPATIENT)
Dept: DERMATOLOGY CLINIC | Facility: CLINIC | Age: 53
End: 2023-01-12

## 2023-01-12 DIAGNOSIS — L82.1 SEBORRHEIC KERATOSIS: Primary | ICD-10-CM

## 2023-01-12 PROCEDURE — 99202 OFFICE O/P NEW SF 15 MIN: CPT | Performed by: STUDENT IN AN ORGANIZED HEALTH CARE EDUCATION/TRAINING PROGRAM

## 2023-02-09 NOTE — TELEPHONE ENCOUNTER
Spoke to patient, he only has a couple days left of Lisinopril. He said he will schedule a physical on [facility] after work. He requested a 30-day refill  until he can be seen?

## 2023-02-09 NOTE — TELEPHONE ENCOUNTER
Please contact patient to schedule routine physical.  Send back to me if refill needed prior to visit.

## 2023-02-10 RX ORDER — LISINOPRIL AND HYDROCHLOROTHIAZIDE 20; 12.5 MG/1; MG/1
2 TABLET ORAL DAILY
Qty: 180 TABLET | Refills: 0 | Status: SHIPPED | OUTPATIENT
Start: 2023-02-10

## 2023-02-10 NOTE — TELEPHONE ENCOUNTER
Dr Remberto Mcguire  =please advice,thanks     Future Appointments   Date Time Provider Cristopher Kahn   4/13/2023  1:30 PM Derrick Gilmore  [address] [place] [address]

## 2023-04-27 ENCOUNTER — LAB ENCOUNTER (OUTPATIENT)
Dept: LAB | Age: 53
End: 2023-04-27
Attending: FAMILY MEDICINE
Payer: COMMERCIAL

## 2023-04-27 ENCOUNTER — OFFICE VISIT (OUTPATIENT)
Dept: FAMILY MEDICINE CLINIC | Facility: CLINIC | Age: 53
End: 2023-04-27

## 2023-04-27 VITALS
WEIGHT: 205.5 LBS | HEIGHT: 70.87 IN | DIASTOLIC BLOOD PRESSURE: 88 MMHG | TEMPERATURE: 97 F | HEART RATE: 60 BPM | BODY MASS INDEX: 28.77 KG/M2 | SYSTOLIC BLOOD PRESSURE: 128 MMHG

## 2023-04-27 DIAGNOSIS — Z12.5 PROSTATE CANCER SCREENING: ICD-10-CM

## 2023-04-27 DIAGNOSIS — E78.5 HYPERLIPIDEMIA, UNSPECIFIED HYPERLIPIDEMIA TYPE: ICD-10-CM

## 2023-04-27 DIAGNOSIS — Z00.00 ANNUAL PHYSICAL EXAM: ICD-10-CM

## 2023-04-27 DIAGNOSIS — I10 ESSENTIAL HYPERTENSION: ICD-10-CM

## 2023-04-27 DIAGNOSIS — Z00.00 ANNUAL PHYSICAL EXAM: Primary | ICD-10-CM

## 2023-04-27 DIAGNOSIS — Z98.890 HISTORY OF COLONOSCOPY: ICD-10-CM

## 2023-04-27 PROBLEM — Z80.0 FAMILY HISTORY OF COLON CANCER IN FATHER: Status: ACTIVE | Noted: 2023-04-27

## 2023-04-27 LAB
ANION GAP SERPL CALC-SCNC: 5 MMOL/L (ref 0–18)
BUN BLD-MCNC: 13 MG/DL (ref 7–18)
BUN/CREAT SERPL: 11.3 (ref 10–20)
CALCIUM BLD-MCNC: 9.6 MG/DL (ref 8.5–10.1)
CHLORIDE SERPL-SCNC: 103 MMOL/L (ref 98–112)
CHOLEST SERPL-MCNC: 202 MG/DL (ref ?–200)
CO2 SERPL-SCNC: 31 MMOL/L (ref 21–32)
COMPLEXED PSA SERPL-MCNC: 0.84 NG/ML (ref ?–4)
CREAT BLD-MCNC: 1.15 MG/DL
DEPRECATED RDW RBC AUTO: 42.1 FL (ref 35.1–46.3)
ERYTHROCYTE [DISTWIDTH] IN BLOOD BY AUTOMATED COUNT: 12.5 % (ref 11–15)
FASTING PATIENT LIPID ANSWER: NO
FASTING STATUS PATIENT QL REPORTED: NO
GFR SERPLBLD BASED ON 1.73 SQ M-ARVRAT: 76 ML/MIN/1.73M2 (ref 60–?)
GLUCOSE BLD-MCNC: 100 MG/DL (ref 70–99)
HCT VFR BLD AUTO: 46.5 %
HDLC SERPL-MCNC: 58 MG/DL (ref 40–59)
HGB BLD-MCNC: 14.9 G/DL
LDLC SERPL CALC-MCNC: 126 MG/DL (ref ?–100)
MCH RBC QN AUTO: 29.4 PG (ref 26–34)
MCHC RBC AUTO-ENTMCNC: 32 G/DL (ref 31–37)
MCV RBC AUTO: 91.9 FL
NONHDLC SERPL-MCNC: 144 MG/DL (ref ?–130)
OSMOLALITY SERPL CALC.SUM OF ELEC: 288 MOSM/KG (ref 275–295)
PLATELET # BLD AUTO: 218 10(3)UL (ref 150–450)
POTASSIUM SERPL-SCNC: 4.3 MMOL/L (ref 3.5–5.1)
RBC # BLD AUTO: 5.06 X10(6)UL
SODIUM SERPL-SCNC: 139 MMOL/L (ref 136–145)
TRIGL SERPL-MCNC: 100 MG/DL (ref 30–149)
VLDLC SERPL CALC-MCNC: 18 MG/DL (ref 0–30)
WBC # BLD AUTO: 6.7 X10(3) UL (ref 4–11)

## 2023-04-27 PROCEDURE — 99396 PREV VISIT EST AGE 40-64: CPT | Performed by: FAMILY MEDICINE

## 2023-04-27 PROCEDURE — 3074F SYST BP LT 130 MM HG: CPT | Performed by: FAMILY MEDICINE

## 2023-04-27 PROCEDURE — 3008F BODY MASS INDEX DOCD: CPT | Performed by: FAMILY MEDICINE

## 2023-04-27 PROCEDURE — 80048 BASIC METABOLIC PNL TOTAL CA: CPT

## 2023-04-27 PROCEDURE — 85027 COMPLETE CBC AUTOMATED: CPT

## 2023-04-27 PROCEDURE — 36415 COLL VENOUS BLD VENIPUNCTURE: CPT

## 2023-04-27 PROCEDURE — 80061 LIPID PANEL: CPT

## 2023-04-27 PROCEDURE — 3079F DIAST BP 80-89 MM HG: CPT | Performed by: FAMILY MEDICINE

## 2024-07-20 ENCOUNTER — HOSPITAL ENCOUNTER (EMERGENCY)
Facility: HOSPITAL | Age: 54
Discharge: HOME | End: 2024-07-20
Attending: EMERGENCY MEDICINE
Payer: COMMERCIAL

## 2024-07-20 ENCOUNTER — APPOINTMENT (OUTPATIENT)
Dept: GENERAL RADIOLOGY | Facility: HOSPITAL | Age: 54
End: 2024-07-20
Attending: EMERGENCY MEDICINE
Payer: COMMERCIAL

## 2024-07-20 VITALS
OXYGEN SATURATION: 95 % | SYSTOLIC BLOOD PRESSURE: 129 MMHG | DIASTOLIC BLOOD PRESSURE: 88 MMHG | HEART RATE: 63 BPM | WEIGHT: 190 LBS | TEMPERATURE: 98 F | RESPIRATION RATE: 20 BRPM | BODY MASS INDEX: 26.6 KG/M2 | HEIGHT: 71 IN

## 2024-07-20 DIAGNOSIS — S16.1XXA STRAIN OF NECK MUSCLE, INITIAL ENCOUNTER: Primary | ICD-10-CM
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
## 2024-07-20 PROCEDURE — 72040 X-RAY EXAM NECK SPINE 2-3 VW: CPT | Performed by: EMERGENCY MEDICINE

## 2024-07-20 PROCEDURE — 99284 EMERGENCY DEPT VISIT MOD MDM: CPT

## 2024-07-20 PROCEDURE — 99283 EMERGENCY DEPT VISIT LOW MDM: CPT

## 2024-07-20 RX ORDER — IBUPROFEN 600 MG/1
600 TABLET ORAL ONCE
Status: COMPLETED | OUTPATIENT
Start: 2024-07-20 | End: 2024-07-20
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
## 2024-08-19 ENCOUNTER — OFFICE VISIT (OUTPATIENT)
Dept: FAMILY MEDICINE CLINIC | Facility: CLINIC | Age: 54
End: 2024-08-19
Payer: COMMERCIAL

## 2024-08-19 ENCOUNTER — LAB ENCOUNTER (OUTPATIENT)
Dept: LAB | Age: 54
End: 2024-08-19
Attending: FAMILY MEDICINE
Payer: COMMERCIAL

## 2024-08-19 VITALS
HEIGHT: 70 IN | BODY MASS INDEX: 28.35 KG/M2 | WEIGHT: 198 LBS | OXYGEN SATURATION: 95 % | SYSTOLIC BLOOD PRESSURE: 128 MMHG | HEART RATE: 60 BPM | DIASTOLIC BLOOD PRESSURE: 88 MMHG | TEMPERATURE: 98 F

## 2024-08-19 DIAGNOSIS — I10 ESSENTIAL HYPERTENSION: ICD-10-CM

## 2024-08-19 DIAGNOSIS — E66.3 OVERWEIGHT (BMI 25.0-29.9): ICD-10-CM

## 2024-08-19 DIAGNOSIS — Z00.00 ROUTINE PHYSICAL EXAMINATION: ICD-10-CM

## 2024-08-19 DIAGNOSIS — Z00.00 ROUTINE PHYSICAL EXAMINATION: Primary | ICD-10-CM

## 2024-08-19 DIAGNOSIS — E78.5 HYPERLIPIDEMIA, UNSPECIFIED HYPERLIPIDEMIA TYPE: ICD-10-CM

## 2024-08-19 LAB
ANION GAP SERPL CALC-SCNC: 6 MMOL/L (ref 0–18)
BUN BLD-MCNC: 19 MG/DL (ref 9–23)
BUN/CREAT SERPL: 16.2 (ref 10–20)
CALCIUM BLD-MCNC: 9.6 MG/DL (ref 8.7–10.4)
CHLORIDE SERPL-SCNC: 104 MMOL/L (ref 98–112)
CHOLEST SERPL-MCNC: 212 MG/DL (ref ?–200)
CO2 SERPL-SCNC: 30 MMOL/L (ref 21–32)
COMPLEXED PSA SERPL-MCNC: 0.54 NG/ML (ref ?–4)
CREAT BLD-MCNC: 1.17 MG/DL
DEPRECATED RDW RBC AUTO: 41.3 FL (ref 35.1–46.3)
EGFRCR SERPLBLD CKD-EPI 2021: 74 ML/MIN/1.73M2 (ref 60–?)
ERYTHROCYTE [DISTWIDTH] IN BLOOD BY AUTOMATED COUNT: 12.3 % (ref 11–15)
FASTING PATIENT LIPID ANSWER: NO
FASTING STATUS PATIENT QL REPORTED: NO
GLUCOSE BLD-MCNC: 98 MG/DL (ref 70–99)
HCT VFR BLD AUTO: 43.9 %
HDLC SERPL-MCNC: 54 MG/DL (ref 40–59)
HGB BLD-MCNC: 14.5 G/DL
LDLC SERPL CALC-MCNC: 137 MG/DL (ref ?–100)
MCH RBC QN AUTO: 30.1 PG (ref 26–34)
MCHC RBC AUTO-ENTMCNC: 33 G/DL (ref 31–37)
MCV RBC AUTO: 91.3 FL
NONHDLC SERPL-MCNC: 158 MG/DL (ref ?–130)
OSMOLALITY SERPL CALC.SUM OF ELEC: 292 MOSM/KG (ref 275–295)
PLATELET # BLD AUTO: 183 10(3)UL (ref 150–450)
POTASSIUM SERPL-SCNC: 4.4 MMOL/L (ref 3.5–5.1)
RBC # BLD AUTO: 4.81 X10(6)UL
SODIUM SERPL-SCNC: 140 MMOL/L (ref 136–145)
TRIGL SERPL-MCNC: 117 MG/DL (ref 30–149)
VLDLC SERPL CALC-MCNC: 21 MG/DL (ref 0–30)
WBC # BLD AUTO: 6.7 X10(3) UL (ref 4–11)

## 2024-08-19 PROCEDURE — 99396 PREV VISIT EST AGE 40-64: CPT | Performed by: FAMILY MEDICINE

## 2024-08-19 PROCEDURE — 80061 LIPID PANEL: CPT

## 2024-08-19 PROCEDURE — 85027 COMPLETE CBC AUTOMATED: CPT

## 2024-08-19 PROCEDURE — 80048 BASIC METABOLIC PNL TOTAL CA: CPT

## 2024-08-19 PROCEDURE — 36415 COLL VENOUS BLD VENIPUNCTURE: CPT

## 2024-08-19 NOTE — PROGRESS NOTES
Subjective:   Patient ID: Chaim Killian is a 53 year old male.    Patient presents for routine physical.        History/Other:   Review of Systems   Constitutional: Negative.    Respiratory: Negative.     Cardiovascular: Negative.    Gastrointestinal: Negative.    Skin: Negative.    Neurological: Negative.      Current Outpatient Medications   Medication Sig Dispense Refill    lisinopril-hydroCHLOROthiazide 20-12.5 MG Oral Tab Take 2 tablets by mouth daily. 180 tablet 3     Allergies:No Known Allergies    Objective:   Physical Exam    Assessment & Plan:   1. Routine physical examination-patient is , adult children, continuing to work full-time remotely.  Exercising regularly but planning to increase intensity.  Labs done today  Due for colonoscopy next year   2. Essential hypertension-blood pressure above goal of less than 130/80.  Left arm consistently higher than right arm.  Plan to try diet and exercise changes, he will monitor home blood pressures and follow-up in 3 months.   3. Hyperlipidemia, unspecified hyperlipidemia type-10-year CV heart risk 5% last year.  Recheck today.  Recommend heart healthy diet.     4. Overweight (BMI 25.0-29.9)-plan diet and exercise changes above.  More difficulty inconsistent this year with deaths in family, stressors.       Orders Placed This Encounter   Procedures    Lipid Panel [E]    Basic Metabolic Panel (8) [E]    CBC, Platelet, No Differential [E]    PSA (Screening) [E]       Meds This Visit:  Requested Prescriptions      No prescriptions requested or ordered in this encounter       Imaging & Referrals:  None

## 2024-08-19 NOTE — PATIENT INSTRUCTIONS
Goal for blood pressure is 75% of readings less than 130/80.  Supposed to use reading in the higher arm (left arm)  Plan today to work on diet and exercise.  Check blood pressures in about 3 months.  If not at goal please follow-up with me at that time we will just make an appointment at that time and bring home blood pressures.

## 2024-08-26 NOTE — TELEPHONE ENCOUNTER
lisinopril-hydroCHLOROthiazide 20-12.5 MG Oral Tab, Take 2 tablets by mouth daily., Disp: 180 tablet, Rfl: 3

## 2024-08-27 RX ORDER — LISINOPRIL AND HYDROCHLOROTHIAZIDE 12.5; 2 MG/1; MG/1
2 TABLET ORAL DAILY
Qty: 180 TABLET | Refills: 3 | Status: SHIPPED | OUTPATIENT
Start: 2024-08-27

## 2024-08-27 NOTE — TELEPHONE ENCOUNTER
Refill passed per [facility] protocol.  Requested Prescriptions   Pending Prescriptions Disp Refills    lisinopril-hydroCHLOROthiazide 20-12.5 MG Oral Tab 180 tablet 3     Sig: Take 2 tablets by mouth daily.       Hypertension Medications Protocol Passed - 8/26/2024  2:17 PM        Passed - CMP or BMP in past 12 months        Passed - Last BP reading less than 140/90     BP Readings from Last 1 Encounters:   08/19/24 128/88               Passed - In person appointment or virtual visit in the past 12 mos or appointment in next 3 mos     Recent Outpatient Visits              1 week ago Routine physical examination    [facility] ([place], [place])Alesha MD    Office Visit    1 year ago Annual physical exam    [facility] ([place] [place]Alesha MD    Office Visit    [facility]    [facility]    2 years ago Noise effect on inner ear, unspecified laterality    [facility] [place] [facility]    Office Visit    2 years ago [facility]    [facility]                      Passed - EGFRCR or GFRNAA > 50     GFR Evaluation  EGFRCR: 74 , resulted on 8/19/2024               Recent Outpatient Visits              1 week ago Routine physical examination    [facility] Alesha Barros MD    Office Visit    1 year ago Annual physical exam    [facility] ([place] [place]Alesha MD    Office Visit    1 year ago [facility]borrheic keratosis    [facility]    [facility]    2 years ago Noise effect on inner ear, unspecified laterality    [facility]    Office Visit    2 years ago [facility]ine physical examination    [facility]    [facility]

## 2025-06-20 ENCOUNTER — NURSE TRIAGE (OUTPATIENT)
Dept: FAMILY MEDICINE CLINIC | Facility: CLINIC | Age: 55
End: 2025-06-20

## 2025-06-20 NOTE — TELEPHONE ENCOUNTER
Action Requested: Summary for Provider     []  Critical Lab, Recommendations Needed  [] Need Additional Advice  []   FYI    []   Need Orders  [] Need Medications Sent to Pharmacy  []  Other     SUMMARY: patient requesting office visit for rash on buttocks x 4 weeks    Patient states has rash on buttocks for one month that itches.  Patient denies pain, fever, swollen lips/tongue, difficulty breathing.    Patient requests office visit, prefers Dr Barros.  No soon openings. Ok to add to SDS.    Patient aware Dr Barros is out of office and will respond when she returns.  Patient advised IC or ER of symptoms worsen.      Reason for call: No chief complaint on file.  Onset: Data Unavailable                     Reason for Disposition   Red, moist, irritated area between skin folds (or under larger breasts)    Protocols used: Rash or Redness - Brccfcous-S-KF

## 2025-06-24 NOTE — TELEPHONE ENCOUNTER
Patient contacted offered Thursday res24.  Scheduled.  Future Appointments   Date Time Provider Department Center   6/26/2025 11:45 AM Alesha Barros MD [facility]

## 2025-06-26 ENCOUNTER — OFFICE VISIT (OUTPATIENT)
Dept: FAMILY MEDICINE CLINIC | Facility: CLINIC | Age: 55
End: 2025-06-26

## 2025-06-26 VITALS
HEART RATE: 71 BPM | RESPIRATION RATE: 19 BRPM | DIASTOLIC BLOOD PRESSURE: 78 MMHG | OXYGEN SATURATION: 96 % | WEIGHT: 198 LBS | SYSTOLIC BLOOD PRESSURE: 118 MMHG | BODY MASS INDEX: 28.35 KG/M2 | HEIGHT: 70 IN

## 2025-06-26 DIAGNOSIS — L30.9 DERMATITIS: Primary | ICD-10-CM

## 2025-06-26 PROCEDURE — 99213 OFFICE O/P EST LOW 20 MIN: CPT | Performed by: FAMILY MEDICINE

## 2025-06-26 RX ORDER — KETOCONAZOLE 20 MG/G
1 CREAM TOPICAL 2 TIMES DAILY
Qty: 60 G | Refills: 0 | Status: SHIPPED | OUTPATIENT
Start: 2025-06-26 | End: 2025-07-17

## 2025-06-26 RX ORDER — HYDROCORTISONE 25 MG/G
1 CREAM TOPICAL 2 TIMES DAILY
Qty: 60 G | Refills: 0 | Status: SHIPPED | OUTPATIENT
Start: 2025-06-26 | End: 2025-07-06

## 2025-06-29 NOTE — PROGRESS NOTES
Subjective:   Chaim Killian is a 54 year old female who presents for Rash Skin Problem (Patient states has rash on buttocks for 2 months that itches.  Patient denies pain, fever, swollen lips/tongue, difficulty breathing. )       History/Other:   History of Present Illness  Chaim Killian is a 54 year old male who presents with an itchy, scabby rash in the gluteal cleft.    The rash is persistent, located at the top of the gluteal cleft, and is characterized by itchiness and scabbing. There is no recent exposure to swimming pools or hot tubs. He experienced achiness after a long flight but has no additional joint pain or systemic symptoms. Topical treatments include Aquaphor and 1% hydrocortisone cream, which have provided some relief from itchiness but have not resolved the rash.      Chief Complaint Reviewed and Verified  Nursing Notes Reviewed and   Verified  Allergies Reviewed  Medications Reviewed  Problem List   Reviewed         Tobacco:       Current Medications[1]           Review of Systems:  See above      Objective:   /78 (BP Location: Right arm, Patient Position: Sitting, Cuff Size: adult)   Pulse 71   Resp 19   Ht 5' 10\" (1.778 m)   Wt 198 lb (89.8 kg)   SpO2 96%   BMI 28.41 kg/m²    Estimated body mass index is 28.41 kg/m² as calculated from the following:    Height as of this encounter: 5' 10\" (1.778 m).    Weight as of this encounter: 198 lb (89.8 kg).  Results  RADIOLOGY  No results found.       Physical Exam  Physical Exam  GENERAL: Normal appearance.  SKIN: Gluteal cleft with well-defined erythematous scaling macular rash. No purulent discharge or vesicles.      Assessment & Plan:       Assessment & Plan  Assessment & Plan  Seborrheic Dermatitis  Erythematous, scaling rash in gluteal cleft, likely seborrheic dermatitis due to appearance, location, and prolonged sitting. Chronic, recurrent, with fungal component. Empirical treatment chosen   - Prescribe ketoconazole cream twice  daily for three weeks.  - Prescribe hydrocortisone 2.5% cream for ten days.  - Instruct to mix creams in hand or apply sequentially.  - Advise setting reminder for 21-day application.  - Discuss prophylactic antifungal use if recurrent, avoid long-term steroids.  - Advise return if no improvement in 2-3 weeks for further evaluation.            No follow-ups on file.      Alesha Barros MD                [1]   Current Outpatient Medications   Medication Sig Dispense Refill    ketoconazole 2 % External Cream Apply 1 Application topically 2 (two) times daily for 21 days. 60 g 0    hydrocortisone 2.5 % External Cream Apply 1 Application topically 2 (two) times daily for 10 days. 60 g 0    lisinopril-hydroCHLOROthiazide 20-12.5 MG Oral Tab Take 2 tablets by mouth daily. 180 tablet 3

## (undated) DEVICE — MEDI-VAC NON-CONDUCTIVE SUCTION TUBING 6MM X 1.8M (6FT.) L: Brand: CARDINAL HEALTH

## (undated) DEVICE — LINE MNTR ADLT SET O2 INTMD

## (undated) DEVICE — 35 ML SYRINGE REGULAR TIP: Brand: MONOJECT

## (undated) DEVICE — MASK PROC W/VISOR ANTIGLARE

## (undated) DEVICE — Device: Brand: CUSTOM PROCEDURE KIT

## (undated) DEVICE — Device: Brand: DEFENDO AIR/WATER/SUCTION AND BIOPSY VALVE

## (undated) NOTE — MR AVS SNAPSHOT
[facility]  [address], [address] [place] [address]  [phone]               Thank you for choosing us for your health care visit with Eneida Hummel.  Etelvina Lopez MD.  We are glad to serve you and happy to provide you with this summary schedule your appointment. Failure to obtain required authorization numbers can create reimbursement difficulties for you.     Assoc Dx:  Right wrist injury, subsequent encounter [S69.91XD]          Reason for Today's Visit     ER F/U           Medical Issu Tips for making healthy food choices  -   Enjoy your food, but eat less. Fully enjoy your food when eating. Don’t eat while distracted and slow down. Avoid over sized portions. Don’t eat while when you’re bored.      EAT THESE FOODS MORE OFTEN: EAT T